# Patient Record
Sex: MALE | Race: WHITE | URBAN - METROPOLITAN AREA
[De-identification: names, ages, dates, MRNs, and addresses within clinical notes are randomized per-mention and may not be internally consistent; named-entity substitution may affect disease eponyms.]

---

## 2017-09-18 PROBLEM — H52.4: Noted: 2017-09-18

## 2017-09-18 PROBLEM — H52.13: Noted: 2017-09-18

## 2017-09-18 PROBLEM — H52.223: Noted: 2017-09-18

## 2018-10-08 ENCOUNTER — OFFICE VISIT (OUTPATIENT)
Dept: ORTHOPEDIC SURGERY | Age: 43
End: 2018-10-08

## 2018-10-08 VITALS
RESPIRATION RATE: 16 BRPM | HEART RATE: 81 BPM | BODY MASS INDEX: 25.66 KG/M2 | HEIGHT: 75 IN | WEIGHT: 206.4 LBS | SYSTOLIC BLOOD PRESSURE: 136 MMHG | DIASTOLIC BLOOD PRESSURE: 101 MMHG

## 2018-10-08 DIAGNOSIS — M96.1 LUMBAR POST-LAMINECTOMY SYNDROME: Primary | ICD-10-CM

## 2018-10-08 DIAGNOSIS — S39.012A STRAIN OF LUMBAR REGION, INITIAL ENCOUNTER: ICD-10-CM

## 2018-10-08 RX ORDER — AMITRIPTYLINE HYDROCHLORIDE 10 MG/1
TABLET, FILM COATED ORAL
Refills: 3 | COMMUNITY
Start: 2018-10-04

## 2018-10-08 RX ORDER — TESTOSTERONE CYPIONATE 200 MG/ML
INJECTION INTRAMUSCULAR
Refills: 1 | COMMUNITY
Start: 2018-09-04

## 2018-10-08 RX ORDER — CELECOXIB 200 MG/1
CAPSULE ORAL
Refills: 1 | COMMUNITY
Start: 2018-09-14

## 2018-10-08 NOTE — MR AVS SNAPSHOT
[address] [facility] 
 
 
 [address] [facility] 
[phone] Patient: Tamiko Riley MRN: [id number] Visit Information Date & Time Provider Department Dept. Phone Encounter #  
 10/8/2018  1:55 PM Doris Khan MD [address] [place] [address], [address] [facility] [phone] [id number] Follow-up Instructions Return if symptoms worsen or fail to improve. Upcoming Health Maintenance Date Due DTaP/Tdap/Td series (1 - Tdap) 5/9/1995 Influenza Age 5 to Adult 8/1/2018 Allergies as of 10/8/2018  Review Complete On: 10/8/2018 By: Doris Khan MD  
 No Known Allergies Current Immunizations  Never Reviewed No immunizations on file. Not reviewed this visit You Were Diagnosed With   
  
 Codes Comments Lumbar post-laminectomy syndrome    -  Primary ICD-10-CM: M96.1 ICD-9-CM: 722.83 Vitals BP Pulse Resp Height(growth percentile) Weight(growth percentile) BMI  
 (!) 136/101 81 16 6' 3\" (1.905 m) 206 lb 6.4 oz (93.6 kg) 25.8 kg/m2 Smoking Status Never Smoker Vitals History BMI and BSA Data Body Mass Index Body Surface Area  
 25.8 kg/m 2 2.23 m 2 Preferred Pharmacy Pharmacy Name Phone [id number] Your Updated Medication List  
  
   
This list is accurate as of 10/8/18  3:06 PM.  Always use your most recent med list.  
  
  
  
  
 amitriptyline 10 mg tablet Commonly known as:  ELAVIL TAKE 2 TABLETS BY MOUTH AT BEDTIME  
  
 celecoxib 200 mg capsule Commonly known as:  CELEBREX  
TAKE ONE CAPSULE BY MOUTH EVERY DAY  
  
 testosterone cypionate 200 mg/mL injection Commonly known as:  DEPOTESTOTERONE CYPIONATE INJECT 1 ML INTO THE MUSCLE EVERY 14 DAYS Follow-up Instructions Return if symptoms worsen or fail to improve. Introducing [facility]! [facility] introduces [employer] patient portal. Now you can access parts of your medical record, email your doctor's office, and request medication refills online. 1. In your internet browser, go to [email or web address] 2. Click on the First Time User? Click Here link in the Sign In box. You will see the New Member Sign Up page. 3. Enter your [employer] Access Code exactly as it appears below. You will not need to use this code after youve completed the sign-up process. If you do not sign up before the expiration date, you must request a new code. · [employer] Access Code: [email or web address] Expires: 1/3/2019  2:12 PM 
 
4. Enter the last four digits of your Social Security Number (xxxx) and Date of Birth (mm/dd/yyyy) as indicated and click Submit. You will be taken to the next sign-up page. 5. Create a [employer] ID. This will be your [employer] login ID and cannot be changed, so think of one that is secure and easy to remember. 6. Create a [employer] password. You can change your password at any time. 7. Enter your Password Reset Question and Answer. This can be used at a later time if you forget your password. 8. Enter your e-mail address. You will receive e-mail notification when new information is available in [address] 9. Click Sign Up. You can now view and download portions of your medical record. 10. Click the Download Summary menu link to download a portable copy of your medical information. If you have questions, please visit the Frequently Asked Questions section of the [employer] website. Remember, [employer] is NOT to be used for urgent needs. For medical emergencies, dial 911. Now available from your iPhone and Android! Please provide this summary of care documentation to your next provider. Your primary care clinician is listed as Viviana Rutledge. If you have any questions after today's visit, please call [phone].

## 2018-10-08 NOTE — PROGRESS NOTES
[address] [facility] 
[address], [address] Phone: [phone] Fax: [phone] INITIAL CONSULTATION 
 
 
HISTORY OF PRESENT ILLNESS: 
Salud Hwang is a 39 y.o. male whom is self-referred secondary to low back pain (L>R) radiating into the LLE in an L5 distribution to the knee x 2 months. He rates his pain 1-4/10. Patient was bending over in August and heard a loud pop, with onset of pain an hour following that. He reports his pain has improved since then, dramatically in the past 1-2 weeks. He reports popping sensation in his back when he is walking. Per patient, he has a hx of lumbar spinal fusions x 3, in [place] [address], in 2006 L4-S1, and in 2016 by Dr. Abdi Lopez at [facility], bridging those two fusions. He has a remote hx of lumbar injections before his final surgery. He also has a remote hx of PT. He treated with MDP with temporary relief and Celebrex. Pt denies change in bowel or bladder habits. Pt denies fever, weight loss, or skin changes. Lumbar spine XR dated 8/15/18 reviewed. Reports not available for my review. Preliminary reading revealed: extensive thoracolumbar fusion, extending from the sacrum into the thoracic spine. Laminotomy defect at L4-S1 with posterolateral fusion L4-S1. Fusion appears solid. No evidence of hardware failure appreciated. The patient is LHD.  reviewed. Body mass index is 25.8 kg/(m^2). PCP: Nuria Eduardo MD 
 
History reviewed. No pertinent past medical history. Past Surgical History:  
Procedure Laterality Date  HX LUMBAR FUSION    
 HX ORTHOPAEDIC T2-L1 surgery, 2008, 6/2016 Social History Substance Use Topics  Smoking status: Never Smoker  Smokeless tobacco: Never Used  Alcohol use No  
 
Work status: The patient is N/A. Marital status: The patient is single. No Known Allergies No family history on file.  
 
 
REVIEW OF SYSTEMS 
 Constitutional symptoms: Negative Eyes: Negative Ears, Nose, Throat, and Mouth: Negative Cardiovascular: Negative Respiratory: Negative Genitourinary: Negative Integumentary (Skin and/or breast): Negative Musculoskeletal: Positive for low back pain, LLE pain Extremities: Negative for edema. Endocrine/Rheumatologic: Negative Hematologic/Lymphatic: Negative Allergic/Immunologic: Negative Psychiatric: Negative PHYSICAL EXAMINATION Visit Vitals  BP (!) 136/101  Pulse 81  Resp 16  
 Ht 6' 3\" (1.905 m)  Wt 93.6 kg (206 lb 6.4 oz)  BMI 25.8 kg/m2 CONSTITUTIONAL: NAD, A&O x 3 HEART: Regular rate and rhythm ABDOMEN: Positive bowel sounds, soft, nontender, and nondistended LUNGS: Clear to auscultation bilaterally. SKIN: Negative for rash. Well healed surgical scar. RANGE OF MOTION: The patient has full passive range of motion in all four extremities. SENSATION: Decreased sensation to light touch bilateral lateral thighs. Sensation to light touch otherwise intact. MOTOR:  
Straight Leg Raise: Negative, bilateral 
Ayala: Negative, bilateral 
Deep tendon reflexes are 2+ at the brachioradialis, biceps, and triceps bilaterally. Deep tendon reflexes are 1+ at the right knee and 0 at the left knee, and 0 at the right ankle and 1+ at the left ankle. STEVEN SIGN: Negative, right Shoulder AB/Flex Elbow Flex Wrist Ext Elbow Ext Wrist Flex Hand Intrin Tone Right +4/5 +4/5 +4/5 +4/5 +4/5 +4/5 +4/5 Left +4/5 +4/5 +4/5 +4/5 +4/5 +4/5 +4/5 Hip Flex Knee Ext Knee Flex Ankle DF GTE Ankle PF Tone Right +4/5 +4/5 +4/5 +4/5 +4/5 +4/5 +4/5 Left +4/5 +4/5 +4/5 +4/5 +4/5 +4/5 +4/5 ASSESSMENT Diagnoses and all orders for this visit: 1. Lumbar post-laminectomy syndrome 2. Strain of lumbar region, initial encounter IMPRESSIONS/RECOMMENDATIONS: 
Patient presents with recent onset of low back pain radiating into the LLE in an L5 distribution to the knee, with improvement recently. The patient does not think his remaining pain complaints are severe enough to warrant additional workup/treatment at this time. We did discuss a course of physical therapy, but the patient wished to defer at this time to discern if his symptoms will continue to improve of their own accord. Patient is neurologically intact. Multiple treatment options were discussed. I will see the patient back prn. Written by Dominic Ramos, as dictated by Alicia Frias MD 
I examined the patient, reviewed and agree with the note.

## 2019-01-09 ENCOUNTER — IMPORTED ENCOUNTER (OUTPATIENT)
Dept: URBAN - NONMETROPOLITAN AREA CLINIC 1 | Facility: CLINIC | Age: 44
End: 2019-01-09

## 2019-01-09 PROCEDURE — 92014 COMPRE OPH EXAM EST PT 1/>: CPT

## 2019-01-09 PROCEDURE — 92015 DETERMINE REFRACTIVE STATE: CPT

## 2019-01-09 NOTE — PATIENT DISCUSSION
Compound Myopic Astigmatism OU w/Incipient Presbyopia-  discussed findings w/patient-  new spectacle Rx issued-  continue to monitor yearly or prnNote: no CL update at this time; 's Notes: MR 1/9/2019DFE 1/9/2019

## 2021-02-12 ENCOUNTER — IMPORTED ENCOUNTER (OUTPATIENT)
Dept: URBAN - NONMETROPOLITAN AREA CLINIC 1 | Facility: CLINIC | Age: 46
End: 2021-02-12

## 2021-02-12 PROBLEM — H52.13: Noted: 2021-02-12

## 2021-02-12 PROBLEM — H52.223: Noted: 2021-02-12

## 2021-02-12 PROBLEM — H00.14: Noted: 2021-02-12

## 2021-02-12 PROBLEM — H52.4: Noted: 2021-02-12

## 2021-02-12 PROCEDURE — 99213 OFFICE O/P EST LOW 20 MIN: CPT

## 2021-02-12 NOTE — PATIENT DISCUSSION
Chalazion WAYNE- Discussed findings in detail with patient. - Continue warm compresses (30-40 minutes)- Continue to monitorCompound Myopic Astigmatism OU w/Incipient Presbyopia-  discussed findings w/patient-  MR done today but recommend hold off on updating once chalazion is resolved-  continue to monitor yearly or prn; 's Notes: MR 1/9/2019DFE 1/9/2019

## 2021-02-23 ENCOUNTER — IMPORTED ENCOUNTER (OUTPATIENT)
Dept: URBAN - NONMETROPOLITAN AREA CLINIC 1 | Facility: CLINIC | Age: 46
End: 2021-02-23

## 2021-02-23 PROBLEM — H52.13: Noted: 2021-02-23

## 2021-02-23 PROBLEM — H00.14: Noted: 2021-02-23

## 2021-02-23 PROBLEM — H52.223: Noted: 2021-02-23

## 2021-02-23 PROBLEM — H52.4: Noted: 2021-02-23

## 2021-02-23 PROCEDURE — 92014 COMPRE OPH EXAM EST PT 1/>: CPT

## 2021-02-23 PROCEDURE — 92015 DETERMINE REFRACTIVE STATE: CPT

## 2022-04-09 ASSESSMENT — TONOMETRY
OD_IOP_MMHG: 15
OS_IOP_MMHG: 15
OD_IOP_MMHG: 15
OS_IOP_MMHG: 16
OD_IOP_MMHG: 18
OS_IOP_MMHG: 18

## 2022-04-09 ASSESSMENT — VISUAL ACUITY
OS_SC: 20/20
OS_SC: 20/50
OS_SC: 20/20
OU_SC: 20/20
OU_SC: 20/20
OD_SC: 20/20
OD_SC: 20/20
OS_PH: 20/25
OD_SC: 20/20

## 2022-05-27 ENCOUNTER — COMPREHENSIVE EXAM (OUTPATIENT)
Dept: URBAN - NONMETROPOLITAN AREA CLINIC 4 | Facility: CLINIC | Age: 47
End: 2022-05-27

## 2022-05-27 DIAGNOSIS — H52.13: ICD-10-CM

## 2022-05-27 PROCEDURE — 92014 COMPRE OPH EXAM EST PT 1/>: CPT

## 2022-05-27 PROCEDURE — 92015 DETERMINE REFRACTIVE STATE: CPT

## 2022-05-27 ASSESSMENT — VISUAL ACUITY
OD_CC: 20/20
OS_CC: 20/25

## 2022-05-27 ASSESSMENT — TONOMETRY
OD_IOP_MMHG: 15
OS_IOP_MMHG: 16

## 2024-02-05 ENCOUNTER — COMPREHENSIVE EXAM (OUTPATIENT)
Dept: URBAN - NONMETROPOLITAN AREA CLINIC 4 | Facility: CLINIC | Age: 49
End: 2024-02-05

## 2024-02-05 DIAGNOSIS — H52.13: ICD-10-CM

## 2024-02-05 DIAGNOSIS — H52.223: ICD-10-CM

## 2024-02-05 DIAGNOSIS — H52.4: ICD-10-CM

## 2024-02-05 PROCEDURE — 92015 DETERMINE REFRACTIVE STATE: CPT

## 2024-02-05 PROCEDURE — 92014 COMPRE OPH EXAM EST PT 1/>: CPT

## 2024-02-05 ASSESSMENT — TONOMETRY
OS_IOP_MMHG: 15
OD_IOP_MMHG: 13

## 2024-02-05 ASSESSMENT — VISUAL ACUITY
OS_CC: 20/20-1
OD_CC: 20/20